# Patient Record
(demographics unavailable — no encounter records)

---

## 2024-10-22 NOTE — HISTORY OF PRESENT ILLNESS
[FreeTextEntry1] : 70M w/ PMHx of HTN, NIDDM, Hyperlipidemia, Pre-DM, anemia, vitamin d insuff, here for follow up for dementia on donepezil  At prior visit he was forgetting medications, son helps with taking meds, difficulty remembering dates. He has been lost twice; he doesn't go outside by himself anymore. He Needs help with his ADLs. Can dress, clean and feed himself. No recent falls, weakness, numbness, incontinence. He has forgotten the oven on several times, and occasionally forgot to lock the door. Denies depressed mood, but complains of loneliness, still having interest in going out with his friends. Sleeps well overnight, wakes up 1-2 times to use bathroom. Denies any confusion or hallucination overnight.  MRI brain from 2023 with chronic ischemic changes at the basal ganglia, and thalami still on asa 81 mg qd and lipitor Last MOCA 2/14/2023: 15 Labs from 2022 syphillis, HIV, folate and B12 were normal, current TSH lipid panel were normal [facility]  used: [id number]

## 2024-10-22 NOTE — END OF VISIT
Quality 111:Pneumonia Vaccination Status For Older Adults: Documentation of medical reason(s) for not administering pneumococcal vaccine (e.g., adverse reaction to vaccine) Quality 226: Preventive Care And Screening: Tobacco Use: Screening And Cessation Intervention: Patient screened for tobacco use, is a smoker AND received Cessation Counseling within the Previous 12 Months [] : Resident Detail Level: Detailed [FreeTextEntry3] : Cognitive impairment over 4 years, prior MoCA 15 on donepezil 5mg bid tolerated well without cholinergic adverse effects. MRI 2/2022 showed cerebral small vessel disease and mild atrophy. Son declined formal neuropsychiatric evaluation or FDG-PET but was amenable to referral to cognitive neurology. Son believes social isolation may be contributing to the cognitive impairment.  Patient denies depressive symptoms.  They are involved in a  program that gives him more social interaction. RTC as needed Quality 137: Melanoma: Continuity Of Care - Recall System: Recall system not utilized, reason not otherwise specified Quality 110: Preventive Care And Screening: Influenza Immunization: Influenza Immunization not Administered for Documented Reasons. Quality 130: Documentation Of Current Medications In The Medical Record: Current Medications Documented Quality 431: Preventive Care And Screening: Unhealthy Alcohol Use - Screening: Patient not identified as an unhealthy alcohol user when screened for unhealthy alcohol use using a systematic screening method

## 2024-10-22 NOTE — PHYSICAL EXAM
[General Appearance - Alert] : alert [General Appearance - In No Acute Distress] : in no acute distress [Oriented To Time, Place, And Person] : oriented to person, place, and time [Impaired Insight] : insight and judgment were intact [Affect] : the affect was normal [Person] : oriented to person [Place] : oriented to place [Time] : oriented to time [Concentration Intact] : normal concentrating ability [Visual Intact] : visual attention was ~T not ~L decreased [Naming Objects] : no difficulty naming common objects [Repeating Phrases] : no difficulty repeating a phrase [Writing A Sentence] : no difficulty writing a sentence [Fluency] : fluency intact [Comprehension] : comprehension intact [Reading] : reading intact [Past History] : adequate knowledge of personal past history [Cranial Nerves Optic (II)] : visual acuity intact bilaterally,  visual fields full to confrontation, pupils equal round and reactive to light [Cranial Nerves Oculomotor (III)] : extraocular motion intact [Cranial Nerves Trigeminal (V)] : facial sensation intact symmetrically [Cranial Nerves Facial (VII)] : face symmetrical [Cranial Nerves Vestibulocochlear (VIII)] : hearing was intact bilaterally [Cranial Nerves Glossopharyngeal (IX)] : tongue and palate midline [Cranial Nerves Accessory (XI - Cranial And Spinal)] : head turning and shoulder shrug symmetric [Cranial Nerves Hypoglossal (XII)] : there was no tongue deviation with protrusion [Motor Tone] : muscle tone was normal in all four extremities [Motor Strength] : muscle strength was normal in all four extremities [No Muscle Atrophy] : normal bulk in all four extremities [Motor Handedness Right-Handed] : the patient is right hand dominant [Sensation Tactile Decrease] : light touch was intact [Abnormal Walk] : normal gait [Balance] : balance was intact [1+] : Ankle jerk left 1+ [Motor Strength Upper Extremities Bilaterally] : strength was normal in both upper extremities [Past-pointing] : there was no past-pointing [Tremor] : no tremor present [Coordination - Dysmetria Impaired Finger-to-Nose Bilateral] : not present [Plantar Reflex Right Only] : normal on the right [Plantar Reflex Left Only] : normal on the left [FreeTextEntry4] : Delayed recall: 2/3 can follow complex crossed commands

## 2024-10-22 NOTE — ASSESSMENT
[FreeTextEntry1] : 70M w/ PMHx of HTN, NIDDM, Hyperlipidemia, Pre-DM, anemia, vitamin d insuff, here for follow up for dementia on donepezil  His symptoms have remained stable.   Plan - continue donepezil 5 mg BID - continue asa 81 mg qd and lipitor - will give referral for Dr. Gee Forde cognitive specialist.  - RTC PRN

## 2025-03-13 NOTE — ASSESSMENT
[FreeTextEntry1] : 71yo male with DM, HTN, hyperlipidemia, anemia, dementia, BPH, morbid obesity; here for follow up. Patient has no complaints.  #DM/obesity - A1C 6.4   - on metformin, pioglitazone, and Trulicity - follow ophthalmology (Sep 2024) - foot care provide by pt's son (podiatrist) - dietary/lifestyle modification - continue low salt, low fat/chol. high fiber ADA diet  #HTN - clinic BP. controlled - continue lisinopril and HCTZ  #Hyperlipidemia - LDL 60, triglyceride 119 - continue atorvastatin  # Anemia - had colonoscopy done in 2021 showed 1.5cm polyp (tubulovillous adenoma) in sigmoid colon, internal/external hemorrhoids, mod. diverticulosis of left side of colon. -next colonoscopy was due for 2024 - GI referral sent for a new colonoscopy.  #Forgetfulness/dementia - on donepezil -c/w ASA and Lipitor -follows with Neurology, cognitive specialist referral  #BPH -stable on flomax, continue med  #Vitamin D insufficiency - start OTC Vitamin D3 2000unit (50mcg) daily  #HCM - follow up in 6 months - blood work ordered for next visit - GI referral sent for a new colonoscopy. - due for tetanus shot, advised patient and son to get it in a local pharmacy or come in earlier

## 2025-03-13 NOTE — HISTORY OF PRESENT ILLNESS
[FreeTextEntry1] : Follow up [de-identified] : 71yo male with DM, HTN, hyperlipidemia, anemia, dementia, BPH, morbid obesity; here for follow up. Patient has no complaints.

## 2025-03-21 NOTE — REVIEW OF SYSTEMS
[Heartburn] : heartburn [Fever] : no fever [Chills] : no chills [Sore Throat] : no sore throat [Chest Pain] : no chest pain [Palpitations] : no palpitations [Shortness Of Breath] : no shortness of breath [SOB on Exertion] : no shortness of breath during exertion [Abdominal Pain] : no abdominal pain [Vomiting] : no vomiting [Constipation] : no constipation [Diarrhea] : no diarrhea [Melena (black stool)] : no melena [Bleeding] : no bleeding [Fecal Incontinence (soiling)] : no fecal incontinence [Bloating (gassiness)] : no bloating [Confused] : no confusion

## 2025-03-21 NOTE — PHYSICAL EXAM
[Alert] : alert [Well Developed] : well developed [Sclera] : the sclera and conjunctiva were normal [Normal Appearance] : the appearance of the neck was normal [No Respiratory Distress] : no respiratory distress [No Acc Muscle Use] : no accessory muscle use [Respiration, Rhythm And Depth] : normal respiratory rhythm and effort [Heart Rate And Rhythm] : heart rate was normal and rhythm regular [Normal S1, S2] : normal S1 and S2 [Murmurs] : no murmurs [Bowel Sounds] : normal bowel sounds [Abdomen Tenderness] : non-tender [No Masses] : no abdominal mass palpated [Abdomen Soft] : soft [] : no hepatosplenomegaly [No CVA Tenderness] : no CVA  tenderness [Abnormal Walk] : normal gait [Normal Color / Pigmentation] : normal skin color and pigmentation

## 2025-03-21 NOTE — ASSESSMENT
[FreeTextEntry1] : 71 yo Nepali speaking male patient with hx of Dementia, Takes Aspirin for primary prophylaxis, Hx of anemia, H pylori s/p Rx but no confirmation of eradication (Dx in 05/2021 during EGD), and gastric IM on EGD 05/2021    Hx of anemia- Resolved Hx of H pylori s/p Rx but no confirmation of eradication (Dx in 05/2021 during EGD)  gastric IM on EGD 05/2021 (no mapping performed) * Last EGD 05/10/2021 normal E, erosive gastritis (HP and IM in antrum; HP no IM in body); normal duodenal mucosa -> no confirmation of eradication of H pylori performed; no mapping performed due to loss of fu * Colonoscopy 05/2021: 1.5cm TVA without HGD in sigmoid; int/ext hemorrhoids; mod left diverticulosis -> plan was to repeat in 3 years -> did not follow  * No FHx of GI cancers  - Will schedule patient for repeat EGD (with mapping and to confirm H pylori eradication) and colonoscopy; educated about need to hold Trulicity for 1 week - Educated about need to be on clear liquid diet for 1 day before procedure (no solid foods) - Educated about need to drink 4L golytely 1 day before procedure and Dulcolax 20mg at bedtime night before procedure - Counseled about importance of adequate hydration, high fiber diet, and bowel regimen as needed to avoid constipation - Counseled about lifestyle modifications: avoiding NSAIDs, late dinners, spicy foods, and head of bed elevation - Not on PPI so no need to hold for 2 weeks prior to EGD